# Patient Record
Sex: FEMALE | Race: WHITE | NOT HISPANIC OR LATINO | Employment: FULL TIME | URBAN - METROPOLITAN AREA
[De-identification: names, ages, dates, MRNs, and addresses within clinical notes are randomized per-mention and may not be internally consistent; named-entity substitution may affect disease eponyms.]

---

## 2022-02-26 ENCOUNTER — HOSPITAL ENCOUNTER (EMERGENCY)
Facility: HOSPITAL | Age: 62
Discharge: HOME | End: 2022-02-26
Attending: EMERGENCY MEDICINE | Admitting: EMERGENCY MEDICINE

## 2022-02-26 ENCOUNTER — APPOINTMENT (OUTPATIENT)
Dept: CT IMAGING | Facility: HOSPITAL | Age: 62
End: 2022-02-26

## 2022-02-26 VITALS
TEMPERATURE: 98.1 F | BODY MASS INDEX: 30.82 KG/M2 | SYSTOLIC BLOOD PRESSURE: 151 MMHG | DIASTOLIC BLOOD PRESSURE: 81 MMHG | RESPIRATION RATE: 16 BRPM | HEIGHT: 65 IN | OXYGEN SATURATION: 97 % | HEART RATE: 80 BPM | WEIGHT: 185 LBS

## 2022-02-26 DIAGNOSIS — S09.90XA MINOR HEAD INJURY, INITIAL ENCOUNTER: Primary | ICD-10-CM

## 2022-02-26 DIAGNOSIS — S01.01XA LACERATION OF SCALP, INITIAL ENCOUNTER: ICD-10-CM

## 2022-02-26 PROCEDURE — 99283 EMERGENCY DEPT VISIT LOW MDM: CPT

## 2022-02-26 PROCEDURE — 70450 CT HEAD/BRAIN W/O DYE: CPT

## 2022-02-26 RX ORDER — LIDOCAINE HYDROCHLORIDE 10 MG/ML
10 INJECTION, SOLUTION EPIDURAL; INFILTRATION; INTRACAUDAL; PERINEURAL ONCE
Status: COMPLETED | OUTPATIENT
Start: 2022-02-26 | End: 2022-02-26

## 2022-02-26 RX ADMIN — LIDOCAINE HYDROCHLORIDE 10 ML: 10 INJECTION, SOLUTION EPIDURAL; INFILTRATION; INTRACAUDAL; PERINEURAL at 21:42

## 2023-06-02 ENCOUNTER — HOSPITAL ENCOUNTER (EMERGENCY)
Facility: HOSPITAL | Age: 63
Discharge: LEFT BEFORE DISCHARGE | End: 2023-06-02

## 2023-06-02 VITALS
DIASTOLIC BLOOD PRESSURE: 81 MMHG | OXYGEN SATURATION: 98 % | BODY MASS INDEX: 30.82 KG/M2 | HEIGHT: 65 IN | WEIGHT: 185 LBS | SYSTOLIC BLOOD PRESSURE: 190 MMHG | HEART RATE: 74 BPM | RESPIRATION RATE: 18 BRPM

## 2023-06-02 PROCEDURE — 99211 OFF/OP EST MAY X REQ PHY/QHP: CPT

## 2023-06-04 ENCOUNTER — APPOINTMENT (OUTPATIENT)
Dept: GENERAL RADIOLOGY | Facility: HOSPITAL | Age: 63
End: 2023-06-04
Payer: COMMERCIAL

## 2023-06-04 ENCOUNTER — HOSPITAL ENCOUNTER (EMERGENCY)
Facility: HOSPITAL | Age: 63
Discharge: HOME | End: 2023-06-05
Attending: EMERGENCY MEDICINE
Payer: COMMERCIAL

## 2023-06-04 DIAGNOSIS — M54.32 SCIATIC PAIN, LEFT: ICD-10-CM

## 2023-06-04 DIAGNOSIS — M54.50 LUMBOSACRAL PAIN: Primary | ICD-10-CM

## 2023-06-04 PROCEDURE — 25010000002 KETOROLAC TROMETHAMINE PER 15 MG: Performed by: PHYSICIAN ASSISTANT

## 2023-06-04 PROCEDURE — 25010000002 HYDROMORPHONE PER 4 MG: Performed by: EMERGENCY MEDICINE

## 2023-06-04 PROCEDURE — 72100 X-RAY EXAM L-S SPINE 2/3 VWS: CPT

## 2023-06-04 PROCEDURE — 25010000002 DEXAMETHASONE SODIUM PHOSPHATE 100 MG/10ML SOLUTION: Performed by: PHYSICIAN ASSISTANT

## 2023-06-04 RX ORDER — KETOROLAC TROMETHAMINE 15 MG/ML
15 INJECTION, SOLUTION INTRAMUSCULAR; INTRAVENOUS ONCE
Status: COMPLETED | OUTPATIENT
Start: 2023-06-04 | End: 2023-06-04

## 2023-06-04 RX ORDER — SODIUM CHLORIDE 0.9 % (FLUSH) 0.9 %
10 SYRINGE (ML) INJECTION AS NEEDED
Status: DISCONTINUED | OUTPATIENT
Start: 2023-06-04 | End: 2023-06-05 | Stop reason: HOSPADM

## 2023-06-04 RX ORDER — HYDROMORPHONE HYDROCHLORIDE 1 MG/ML
0.5 INJECTION, SOLUTION INTRAMUSCULAR; INTRAVENOUS; SUBCUTANEOUS ONCE
Status: COMPLETED | OUTPATIENT
Start: 2023-06-04 | End: 2023-06-04

## 2023-06-04 RX ADMIN — KETOROLAC TROMETHAMINE 15 MG: 15 INJECTION, SOLUTION INTRAMUSCULAR; INTRAVENOUS at 21:42

## 2023-06-04 RX ADMIN — HYDROMORPHONE HYDROCHLORIDE 0.5 MG: 1 INJECTION, SOLUTION INTRAMUSCULAR; INTRAVENOUS; SUBCUTANEOUS at 22:53

## 2023-06-04 RX ADMIN — DEXAMETHASONE SODIUM PHOSPHATE 10 MG: 10 INJECTION, SOLUTION INTRAMUSCULAR; INTRAVENOUS at 21:43

## 2023-06-05 VITALS
HEART RATE: 63 BPM | BODY MASS INDEX: 30.82 KG/M2 | RESPIRATION RATE: 18 BRPM | HEIGHT: 65 IN | SYSTOLIC BLOOD PRESSURE: 143 MMHG | DIASTOLIC BLOOD PRESSURE: 81 MMHG | TEMPERATURE: 97.9 F | OXYGEN SATURATION: 98 % | WEIGHT: 185 LBS

## 2023-06-05 NOTE — DISCHARGE INSTRUCTIONS
Symptomatic care is recommended. Take all medications as prescribed and instructed. Follow up with orthopedic surgeon as scheduled and primary care as directed or return to Emergency Department with worsening of symptoms.

## 2023-06-05 NOTE — ED PROVIDER NOTES
EMERGENCY DEPARTMENT ENCOUNTER    Pt Name: Haylee Arce  MRN: [id number]  Pt :   1960  Room Number:    Date of encounter:  2023  PCP: James Barrera MD  ED Provider: SANDRA Odom    Historian: Patient    HPI:  Chief Complaint: Low back pain, left leg pain    Context: Haylee Arce is a 62 y.o. female who presents to the ED c/o pain in her low back and pain in her left leg.  Patient has known L5-S1 disc bulge diagnosed by MRI but has reported increased pain over the last 2 to 3 days.  She presented to this emergency department Friday evening and because of the long wait in the lobby secondary to patient volume she left without being seen.  Saturday morning she presented to [facility] for her worsening pain where she received prednisone, Percocet and muscle relaxers.  She stated that she had some improvement but that she continues to experience pain in her low back that radiates into her left leg.  Patient has been followed by neurosurgery with [facility] and was evaluated by Dr. Gu who suggested patient was a surgical candidate but due to the type of surgery recommended patient declined and asked if there were nonoperable alternatives.  Patient was then sent to pain management where she received an epidural steroid injection (ANJANA) she shares that this did not help her pain and that she has not been back to pain management.  She reports the pain is worse with movement.  She denies any specific alleviating factors.  She reports that when the pain is bad she has difficulty ambulating and bearing weight.  She reports no loss of bowel or bladder function or saddle numbness.  HPI     REVIEW OF SYSTEMS  A chief complaint appropriate review of systems was completed and is negative except as noted in the HPI.     PAST MEDICAL HISTORY  No past medical history on file.    PAST SURGICAL HISTORY  No past surgical history on file.    FAMILY HISTORY  No family history on  file.    SOCIAL HISTORY  Social History     Socioeconomic History    Marital status:        ALLERGIES  Clindamycin/lincomycin    PHYSICAL EXAM  Physical Exam  ***    LAB RESULTS  No results found for this or any previous visit.    If labs were ordered, I independently reviewed the results and considered them in treating the patient.    RADIOLOGY  XR Spine Lumbar 2 or 3 View    (Results Pending)     [] Radiologist's Report Reviewed:  I ordered and independently reviewed the above noted radiographic studies.  See radiologist's dictation for official interpretation.      PROCEDURES    Procedures    No orders to display       MEDICATIONS GIVEN IN ER    Medications   sodium chloride 0.9 % flush 10 mL (has no administration in time range)   ketorolac (TORADOL) injection 15 mg (has no administration in time range)   dexamethasone (DECADRON) IVPB 10 mg (has no administration in time range)       MEDICAL DECISION MAKING, PROGRESS, and CONSULTS   Medical Decision Making  Amount and/or Complexity of Data Reviewed  Radiology: ordered.    Risk  Prescription drug management.        All labs have been independently reviewed by me.  All radiology studies have been reviewed by me and the radiologist dictating the report.  All EKG's have been independently viewed by me.    [] Discussed with radiology regarding test interpretation:    Discussion below represents my analysis of pertinent findings related to patient's condition, differential diagnosis, treatment plan and final disposition.    Differential diagnosis:  The differential diagnosis associated with the patient's presentation includes: ***    Additional sources  Discussed/ obtained information from independent historians:   [] Spouse  [] Parent  [] Family member  [] Friend  [] EMS   [] Other:  External (non-ED) record review:   [] Inpatient record:   [] Office record:   [] Outpatient record:   [] Prior Outpatient labs:   [] Prior Outpatient radiology:   [] Primary Care  record:   [] Outside ED record:   [] Other:   Patient's care impacted by:   [] Diabetes  [] Hypertension  [] Hyperlipidemia  [] Hypothyroidism   [] Coronary Artery Disease   [] COPD   [] Cancer   [] Obesity  [] GERD   [] Tobacco Abuse   [] Substance Abuse    [] Anxiety   [] Depression   [] Other:   Care significantly affected by Social Determinants of Health (housing and economic circumstances, unemployment)    [] Yes     [] No   If yes, Patient's care significantly limited by  Social Determinants of Health including:   [] Inadequate housing   [] Low income   [] Alcoholism and drug addiction in family   [] Problems related to primary support group   [] Unemployment   [] Problems related to employment   [] Other Social Determinants of Health:     Shared decision making:  ***    Orders placed during this visit:  Orders Placed This Encounter   Procedures    XR Spine Lumbar 2 or 3 View    Insert Peripheral IV       I considered prescription management  with:   [] Pain medication  [] Antiviral  [] Antibiotic   [] Other:   Rationale:  Additional orders considered but not ordered:  The following testing was considered but ultimately not selected after discussion with patient/family:***    ED Course:              DIAGNOSIS  Final diagnoses:   None       DISPOSITION    ED Disposition       None            Please note that portions of this document were completed with voice recognition software.      abscess), impingement syndrome, sciatica.      Additional sources  Discussed/ obtained information from independent historians:   [] Spouse  [] Parent  [] Family member  [] Friend  [] EMS   [] Other:  External (non-ED) record review:   [] Inpatient record:   [] Office record:   [] Outpatient record:   [] Prior Outpatient labs:   [] Prior Outpatient radiology:   [] Primary Care record:   [] Outside ED record:   [] Other:   Patient's care impacted by:   [] Diabetes  [] Hypertension  [] Hyperlipidemia  [] Hypothyroidism   [] Coronary Artery Disease   [] COPD   [] Cancer   [] Obesity  [] GERD   [] Tobacco Abuse   [] Substance Abuse    [] Anxiety   [] Depression   [] Other:   Care significantly affected by Social Determinants of Health (housing and economic circumstances, unemployment)    [] Yes     [x] No   If yes, Patient's care significantly limited by  Social Determinants of Health including:   [] Inadequate housing   [] Low income   [] Alcoholism and drug addiction in family   [] Problems related to primary support group   [] Unemployment   [] Problems related to employment   [] Other Social Determinants of Health:     Shared decision making:  I had a discussion with the patient/family regarding diagnosis, diagnostic results, treatment plan, and medications.  The patient/family indicated understanding of these instructions.  I spent adequate time at the bedside preceding discharge necessary to personally discuss the aftercare instructions, giving patient education, providing explanations of the results of our evaluations/findings, and my decision making to assure that the patient/family understand the plan of care.  Time was allotted to answer questions at that time and throughout the ED course.  Emphasis was placed on timely follow-up after discharge.  I also discussed the potential for the development of an acute emergent condition requiring further evaluation, admission, or even surgical intervention. I discussed  that we found nothing during the visit today indicating the need for further workup, admission, or the presence of an unstable medical condition.  I encouraged the patient to return to the emergency department immediately for ANY concerns, worsening, new complaints, or if symptoms persist and unable to seek follow-up in a timely fashion.  The patient/family expressed understanding and agreement with this plan.  The patient will follow-up with primary care and ortho spine asscheduled for reevaluation.      Orders placed during this visit:  Orders Placed This Encounter   Procedures    XR Spine Lumbar 2 or 3 View     I considered prescription management  with:   [x] Pain medication  [] Antiviral  [] Antibiotic   [] Other:   Rationale: The use of prescription pain medication was considered in this patient however not implemented as risks outweigh benefits of prescription pain management and it was felt patient's symptoms could be managed with OTC anti-inflammatory medications and Tylenol. Will defer prescribed pain medication to primary care and/or pain management.      ED Course:    ED Course as of 06/19/23 1243   Sun Jun 04, 2023 2224 On reassessment at bedside patient believes that she has had minimal relief with Toradol.  She is frustrated that she continues to have pain and has not been provided answers other than surgery or pain management to address her pain.  We will try 1 dose of pain medication and reassess at bedside. [JG]   8566 This patient presents with back pain most consistent with sciatica. Differential diagnoses includes lumbago versus musculoskeletal spasm / strain versus sciatica. No back pain red flags on history or physical. Presentation not consistent with malignancy (lack of history of malignancy, lack of B symptoms), fracture (no trauma, no bony tenderness to palpation), cauda equina (no bowel or urinary incontinence/retention, no saddle anesthesia, no distal weakness), AAA, viscus perforation  , pulmonary embolism, renal colic, pyelonephritis (afebrile, no CVAT, no urinary symptoms). Imaging of lumbar spine demonstrates Mild degenerative change with no acute findings. Patient had mild symptomatic relief with Toradol, steroid and 1 dose of pain medication in ED.  Patient has follow-up plan of care in place with orthopedic spine.At time of discharge disposition patient is afebrile, nontoxic appearing, vital signs stable and able to maintain O2 sats of 98% on room air. Patient will be discharged home with symptomatic care and outpatient follow up as scheduled.  [JG]      ED Course User Index  [JG] Quinton Lowery PA            DIAGNOSIS  Final diagnoses:   Lumbosacral pain   Sciatic pain, left   Lumbar degenerative disc disease       DISPOSITION    ED Disposition       ED Disposition   Discharge    Condition   Stable    Comment   --               Please note that portions of this document were completed with voice recognition software.        Quinton Lowery PA  06/19/23 2493